# Patient Record
Sex: FEMALE
[De-identification: names, ages, dates, MRNs, and addresses within clinical notes are randomized per-mention and may not be internally consistent; named-entity substitution may affect disease eponyms.]

---

## 2017-04-13 ENCOUNTER — HOSPITAL ENCOUNTER (OUTPATIENT)
Dept: HOSPITAL 14 - H.OPSURG | Age: 52
Discharge: HOME | End: 2017-04-13
Payer: MEDICAID

## 2017-04-13 VITALS — DIASTOLIC BLOOD PRESSURE: 77 MMHG | SYSTOLIC BLOOD PRESSURE: 110 MMHG | TEMPERATURE: 98.2 F

## 2017-04-13 VITALS — RESPIRATION RATE: 20 BRPM

## 2017-04-13 VITALS — BODY MASS INDEX: 35.9 KG/M2

## 2017-04-13 VITALS — HEART RATE: 100 BPM | OXYGEN SATURATION: 96 %

## 2017-04-13 DIAGNOSIS — M54.30: ICD-10-CM

## 2017-04-13 DIAGNOSIS — K43.2: Primary | ICD-10-CM

## 2017-04-13 DIAGNOSIS — I10: ICD-10-CM

## 2017-04-13 RX ADMIN — HYDROMORPHONE HYDROCHLORIDE PRN MG: 1 INJECTION, SOLUTION INTRAMUSCULAR; INTRAVENOUS; SUBCUTANEOUS at 15:40

## 2017-04-13 RX ADMIN — HYDROMORPHONE HYDROCHLORIDE PRN MG: 1 INJECTION, SOLUTION INTRAMUSCULAR; INTRAVENOUS; SUBCUTANEOUS at 15:10

## 2017-04-13 RX ADMIN — HYDROMORPHONE HYDROCHLORIDE PRN MG: 1 INJECTION, SOLUTION INTRAMUSCULAR; INTRAVENOUS; SUBCUTANEOUS at 15:50

## 2017-04-13 RX ADMIN — HYDROMORPHONE HYDROCHLORIDE PRN MG: 1 INJECTION, SOLUTION INTRAMUSCULAR; INTRAVENOUS; SUBCUTANEOUS at 14:45

## 2017-04-13 RX ADMIN — HYDROMORPHONE HYDROCHLORIDE PRN MG: 1 INJECTION, SOLUTION INTRAMUSCULAR; INTRAVENOUS; SUBCUTANEOUS at 16:10

## 2017-04-13 RX ADMIN — HYDROMORPHONE HYDROCHLORIDE PRN MG: 1 INJECTION, SOLUTION INTRAMUSCULAR; INTRAVENOUS; SUBCUTANEOUS at 15:25

## 2017-04-13 RX ADMIN — HYDROMORPHONE HYDROCHLORIDE PRN MG: 1 INJECTION, SOLUTION INTRAMUSCULAR; INTRAVENOUS; SUBCUTANEOUS at 15:00

## 2017-04-13 RX ADMIN — HYDROMORPHONE HYDROCHLORIDE PRN MG: 1 INJECTION, SOLUTION INTRAMUSCULAR; INTRAVENOUS; SUBCUTANEOUS at 16:30

## 2017-04-13 NOTE — OP
PROCEDURE DATE: 04/13/2017



SURGEON:  Dr. Ramos



ASSISTANT:  Dr. Damon



ANESTHESIA:  General.



ANESTHESIOLOGIST:  Dr. Fung



PREOPERATIVE DIAGNOSIS:  Incisional umbilical hernia.



POSTOPERATIVE DIAGNOSIS:  Incisional umbilical hernia.



PROCEDURE:  Umbilical hernia repair with mesh.



DESCRIPTION OF OPERATION:  With the patient in the supine position under adequate general anesthesia,
 the abdomen was prepped and draped in the usual sterile manner.  A longitudinal incision was made al
mele the right side of the umbilicus, taken down through to the subcutaneous tissue.  Upon entering th
e subcutaneous layer, a hernia sac was noted bulging up into the subcutaneous tissue and the incision
 was extended downward to encompass the area of the hernia sac.  The sac was freed from the surroundi
ng subcutaneous tissue and was noted to extend into the actual umbilicus.  The umbilicus was elevated
 and dissected off the area of the sac.  The sac was opened.  It was noted to contain loculated oment
um, which was adherent at points and there were intra-hernia loculations as well.  The omentum was co
mpletely freed from the sac and the sac was completely freed from the subcutaneous tissue where it ex
tended caudad to the fascial defect.  The hernia sac was divided at the level of the fascia and the o
mentum was completely freed circumferentially and reduced into the peritoneal cavity.  When the sac a
nd the peritoneal surface had been all completely freed, there was noted to be a fascial defect of ap
proximately 2 x 4 cm somewhat longitudinally oriented.  The figure-of-eight sutures of 0 Prolene were
 placed at the upper and lower ends of the defect to medialize the musculature and an 8 cm Ventralex 
hernia patch was then placed within the remaining defect and fully deployed to completely cover the d
efect with the edges of the central portion of the defect being sutured to the suture tails which bro
ught them very close to complete closure.  This suturing was performed with 2-0 Prolene interrupted s
utures.  When this had been completed, the patch was noted to cover the hernia and the defect was wel
l closed.  The base of the umbilicus was sutured down to the area of the closure with a 4-0 Monocryl 
suture and closure was performed first with a few interrupted subcutaneous sutures of 4-0 Monocryl an
d then running subcuticular suture of 4-0 Monocryl and Steri-Strips.  Dry sterile dressing was applie
d.  The patient tolerated the procedure well and transferred to the recovery room in stable condition
.  Estimated blood loss for the procedure was 5 mL.





__________________________________________

Ethan Ramos MD







cc:



DD: 04/13/2017 18:07:13  58

TT: 04/13/2017 19:25:26

Job # [id number]

## 2017-04-13 NOTE — CP.SDSHP
[facility] Day [facility]





- Allergies


Allergies: 


Allergies





ceftriaxone sodium [From Rocephin] Allergy (Verified 08/17/16 16:40)


 RASH


ciprofloxacin [From Cipro] Allergy (Verified 08/17/16 16:40)


 RASH


ciprofloxacin HCl [From Cipro] Allergy (Verified 08/17/16 16:40)


 RASH


naproxen Allergy (Verified 08/17/16 16:40)


 RASH


Penicillins Allergy (Verified 08/17/16 16:40)


 RASH











- Physical Exam


Vital Signs: 


 Vital Signs











  04/13/17 04/13/17





  11:15 11:25


 


Temperature  97.2 F L


 


Pulse Rate 78 78


 


Respiratory  18





Rate  


 


Blood Pressure  122/66


 


O2 Sat by Pulse  100





Oximetry  














Short Stay Discharge





- Short Stay Discharge


Admitting Diagnosis/Reason for Visit: K43.2


Disposition: HOME/ ROUTINE


Referrals: 


Yoana Tenorio MD [Primary Care Provider] - 


Follow-up: 


F/U in office in 1-2 weeks.


Additional Instructions (Diet, Activity): 


May resume regular diet, light activities.  Avoid any heavy lifting >10lbs for 

6 weeks.  Take percocet as prescribed for pain.  Make an appointment to see Dr. Ramos in office in 1-2 weeks.


Progress Note/Discharge Note with Instructions: 


as above

## 2017-04-13 NOTE — PCM.SURG1
Surgeon's Initial Post Op Note





- Surgeon's Notes


Surgeon: Dr. Ramos


Assistant: Dr Damon


Type of Anesthesia: General Endo


Pre-Operative Diagnosis: Incisional hernia


Operative Findings: Incisional Hernia containing omentum


Post-Operative Diagnosis: Incisional Hernia


Operation Performed: Incisional Hernia repair with mesh


Specimen/Specimens Removed: hernia sac


Estimated Blood Loss: EBL {In ML}: 5


Blood Products Given: N/A


Drains Used: No Drains


Post-Op Condition: Good


Date of Surgery/Procedure: 04/13/17


Time of Surgery/Procedure: 14:40

## 2017-08-18 ENCOUNTER — HOSPITAL ENCOUNTER (EMERGENCY)
Dept: HOSPITAL 31 - C.ER | Age: 52
Discharge: HOME | End: 2017-08-18
Payer: MEDICAID

## 2017-08-18 VITALS — HEART RATE: 95 BPM | DIASTOLIC BLOOD PRESSURE: 75 MMHG | SYSTOLIC BLOOD PRESSURE: 124 MMHG | TEMPERATURE: 98.7 F

## 2017-08-18 VITALS — OXYGEN SATURATION: 97 % | RESPIRATION RATE: 18 BRPM

## 2017-08-18 VITALS — BODY MASS INDEX: 35.9 KG/M2

## 2017-08-18 DIAGNOSIS — J20.9: Primary | ICD-10-CM

## 2018-07-23 ENCOUNTER — HOSPITAL ENCOUNTER (EMERGENCY)
Dept: HOSPITAL 31 - C.ER | Age: 53
Discharge: HOME | End: 2018-07-23
Payer: MEDICAID

## 2018-07-23 VITALS
TEMPERATURE: 99.7 F | SYSTOLIC BLOOD PRESSURE: 114 MMHG | DIASTOLIC BLOOD PRESSURE: 75 MMHG | HEART RATE: 86 BPM | RESPIRATION RATE: 16 BRPM

## 2018-07-23 VITALS — OXYGEN SATURATION: 99 %

## 2018-07-23 VITALS — BODY MASS INDEX: 35.9 KG/M2

## 2018-07-23 DIAGNOSIS — J11.1: Primary | ICD-10-CM

## 2018-11-20 ENCOUNTER — HOSPITAL ENCOUNTER (EMERGENCY)
Dept: HOSPITAL 31 - C.ER | Age: 53
Discharge: HOME | End: 2018-11-20
Payer: MEDICAID

## 2018-11-20 VITALS — OXYGEN SATURATION: 98 % | RESPIRATION RATE: 20 BRPM

## 2018-11-20 VITALS — DIASTOLIC BLOOD PRESSURE: 78 MMHG | HEART RATE: 59 BPM | TEMPERATURE: 97.5 F | SYSTOLIC BLOOD PRESSURE: 111 MMHG

## 2018-11-20 VITALS — BODY MASS INDEX: 35.9 KG/M2

## 2018-11-20 DIAGNOSIS — M54.31: Primary | ICD-10-CM

## 2018-11-20 LAB
BILIRUB UR-MCNC: NEGATIVE MG/DL
GLUCOSE UR STRIP-MCNC: NORMAL MG/DL
HCG,QUALITATIVE URINE: NEGATIVE
LEUKOCYTE ESTERASE UR-ACNC: (no result) LEU/UL
PH UR STRIP: 6 [PH] (ref 5–8)
PROT UR STRIP-MCNC: NEGATIVE MG/DL
RBC # UR STRIP: NEGATIVE /UL
SP GR UR STRIP: 1.02 (ref 1–1.03)
SQUAMOUS EPITHIAL: 1 /HPF (ref 0–5)
UROBILINOGEN UR-MCNC: NORMAL MG/DL (ref 0.2–1)

## 2018-11-28 ENCOUNTER — HOSPITAL ENCOUNTER (EMERGENCY)
Dept: HOSPITAL 14 - H.ER | Age: 53
Discharge: HOME | End: 2018-11-28
Payer: MEDICAID

## 2018-11-28 VITALS
SYSTOLIC BLOOD PRESSURE: 113 MMHG | DIASTOLIC BLOOD PRESSURE: 75 MMHG | RESPIRATION RATE: 18 BRPM | HEART RATE: 58 BPM | TEMPERATURE: 97.9 F

## 2018-11-28 VITALS — OXYGEN SATURATION: 99 %

## 2018-11-28 VITALS — BODY MASS INDEX: 33.2 KG/M2

## 2018-11-28 DIAGNOSIS — M54.31: Primary | ICD-10-CM

## 2018-11-28 PROCEDURE — 96372 THER/PROPH/DIAG INJ SC/IM: CPT

## 2018-11-28 PROCEDURE — 99283 EMERGENCY DEPT VISIT LOW MDM: CPT

## 2018-11-28 NOTE — ED PDOC
HPI: Back


Time Seen by Provider: 11/28/18 01:37


Chief Complaint (Nursing): Back Pain


Chief Complaint (Provider): Back pain


History Per: Patient


History/Exam Limitations: no limitations


Onset/Duration Of Symptoms: Days (1 week)


Current Symptoms Are (Timing): Still Present


Severity: Severe


Exacerbating Factor(s): Movement, Sitting, Standing


Additional Complaint(s): 


52 y/o F with HTN and DM-II who presents with back pain x 1 week. Patient states

that she began having Right lower back pain about 1 week with radiation down 

Right leg and to lower abdomen with cramping sensation and some tingling in leg.

She works as an attendant at a gym but denies any trauma, falls, numbness in 

lower extremity, fever, chills. Pt states that she has an allergy to Naproxen 

but has taken Ibuprofen many times w/o any reaction. 





Past Medical History


Reviewed: Historical Data, Nursing Documentation, Vital Signs


Vital Signs: 


                                Last Vital Signs











Temp  98.0 F   11/28/18 00:58


 


Pulse  59 L  11/28/18 00:58


 


Resp  16   11/28/18 00:58


 


BP  116/74   11/28/18 00:58


 


Pulse Ox  99   11/28/18 00:58














- Medical History


PMH: Diabetes, HTN


   Denies: Chronic Kidney Disease





- Family History


Family History: States: Unknown Family Hx





- Immunization History


Hx Tetanus Toxoid Vaccination: No


Hx Influenza Vaccination: No


Hx Pneumococcal Vaccination: No





- Home Medications


Home Medications: 


                                Ambulatory Orders











 Medication  Instructions  Recorded


 


Losartan/Hydrochlorothiazide 1 tab PO DAILY 08/17/16





[Losartan-Hctz 50-12.5 mg Tab]  


 


MetFORMIN ER [Glucophage XR] 500 mg PO DAILY 07/23/18


 


Acetaminophen [Tylenol 325mg tab] 650 mg PO Q4 #50 tab 11/20/18


 


Alogliptin Pastor/Metformin HCl PO BID 11/20/18


 


Lidocaine 5% [Lidoderm] 1 ea TD DAILY #6 patch 11/20/18


 


Poglitazone PO DAILY 11/20/18


 


Cyclobenzaprine [Cyclobenzaprine 10 mg PO Q8 PRN 5 Days  tab 11/28/18





HCl]  


 


Ibuprofen [Motrin Tab] 600 mg PO Q6 PRN 5 Days  tab 11/28/18














- Allergies


Allergies/Adverse Reactions: 


                                    Allergies











Allergy/AdvReac Type Severity Reaction Status Date / Time


 


ceftriaxone sodium Allergy  RASH Verified 11/28/18 00:58





[From Rocephin]     


 


ciprofloxacin [From Cipro] Allergy  RASH Verified 11/28/18 00:58


 


ciprofloxacin HCl Allergy  RASH Verified 11/28/18 00:58





[From Cipro]     


 


naproxen Allergy  RASH Verified 11/28/18 00:58


 


Penicillins Allergy  RASH Verified 11/28/18 00:58














Review of Systems


ROS Statement: Except As Marked, All Systems Reviewed And Found Negative


Constitutional: Negative for: Fever, Chills


Musculoskeletal: Positive for: Back Pain.  Negative for: Neck Pain


Skin: Negative for: Rash





Physical Exam





- Reviewed


Nursing Documentation Reviewed: Yes


Vital Signs Reviewed: Yes





- Physical Exam


Appears: Positive for: Uncomfortable


Head Exam: Positive for: ATRAUMATIC


Skin: Positive for: Normal Color


Neck: Positive for: Normal


Pulses-Dorsalis Pedis (R): 2+


Back: Positive for: Normal Inspection, Vertebral Tenderness, Decreased ROM (with

flexion of back)


Extremity: Negative for: Normal ROM (pain with flexion and abduction at the hip 

on Right)


Neurologic/Psych: Positive for: Alert, Oriented, Other (sensation = in B/L lower

extremitiues)





- ECG


O2 Sat by Pulse Oximetry: 99





Medical Decision Making


Medical Decision Making: 


Toradol 30mg IM x 1


Flexeril 10mg PO x 1


Urine dip: no LE, nitrites





Re-evaluation at 3:40am: pain has improved. Ready for d/c home. 








Disposition





- Clinical Impression


Clinical Impression: 


 Sciatica








- Patient ED Disposition


Is Patient to be Admitted: No


Counseled Patient/Family Regarding: Diagnosis, Need For Followup





- Disposition


Disposition: Routine/Home


Disposition Time: 04:23


Condition: STABLE


Additional Instructions: 


F/u with your primary care doctor as needed. Return to ED if you develop 

worsening numbness or tingling in feet, weakness in lower extremities or 

inability to hold urine/feces. Take Flexeril and Ibuprofen as needed for pain. 

Avoid driving or operating heavy machinery while taking Flexeril. 


Prescriptions: 


Cyclobenzaprine [Cyclobenzaprine HCl] 10 mg PO Q8 PRN 5 Days  tab


 PRN Reason: Pain, Moderate (4-7)


Ibuprofen [Motrin Tab] 600 mg PO Q6 PRN 5 Days  tab


 PRN Reason: Pain, Moderate (4-7)


Instructions:  Sciatica (DC), Sciatica Exercises


Forms:  [facility] (Andorran), [facility] ED School/Work Excuse


Print Language: Chinese

## 2018-12-20 ENCOUNTER — HOSPITAL ENCOUNTER (EMERGENCY)
Dept: HOSPITAL 31 - C.ER | Age: 53
Discharge: HOME | End: 2018-12-20
Payer: MEDICAID

## 2018-12-20 VITALS — RESPIRATION RATE: 16 BRPM

## 2018-12-20 VITALS — OXYGEN SATURATION: 99 %

## 2018-12-20 VITALS — TEMPERATURE: 98.6 F | HEART RATE: 78 BPM | DIASTOLIC BLOOD PRESSURE: 75 MMHG | SYSTOLIC BLOOD PRESSURE: 103 MMHG

## 2018-12-20 VITALS — BODY MASS INDEX: 33.2 KG/M2

## 2018-12-20 DIAGNOSIS — K52.9: Primary | ICD-10-CM

## 2018-12-20 DIAGNOSIS — E11.9: ICD-10-CM

## 2018-12-20 DIAGNOSIS — I10: ICD-10-CM

## 2018-12-20 LAB
ALBUMIN SERPL-MCNC: 4.7 G/DL (ref 3.5–5)
ALBUMIN/GLOB SERPL: 1.6 {RATIO} (ref 1–2.1)
ALT SERPL-CCNC: 39 U/L (ref 9–52)
APTT BLD: 36 SECONDS (ref 21–34)
AST SERPL-CCNC: 41 U/L (ref 14–36)
BACTERIA #/AREA URNS HPF: (no result) /[HPF]
BASOPHILS # BLD AUTO: 0 K/UL (ref 0–0.2)
BASOPHILS NFR BLD: 0.5 % (ref 0–2)
BILIRUB UR-MCNC: NEGATIVE MG/DL
BUN SERPL-MCNC: 18 MG/DL (ref 7–17)
CALCIUM SERPL-MCNC: 9.4 MG/DL (ref 8.6–10.4)
EOSINOPHIL # BLD AUTO: 0.1 K/UL (ref 0–0.7)
EOSINOPHIL NFR BLD: 1.6 % (ref 0–4)
ERYTHROCYTE [DISTWIDTH] IN BLOOD BY AUTOMATED COUNT: 14.5 % (ref 11.5–14.5)
GFR NON-AFRICAN AMERICAN: > 60
GLUCOSE UR STRIP-MCNC: NORMAL MG/DL
HGB BLD-MCNC: 13.9 G/DL (ref 11–16)
INR PPP: 1.1
LEUKOCYTE ESTERASE UR-ACNC: (no result) LEU/UL
LIPASE: 245 U/L (ref 23–300)
LYMPHOCYTES # BLD AUTO: 1.8 K/UL (ref 1–4.3)
LYMPHOCYTES NFR BLD AUTO: 20.1 % (ref 20–40)
MCH RBC QN AUTO: 28.8 PG (ref 27–31)
MCHC RBC AUTO-ENTMCNC: 33.6 G/DL (ref 33–37)
MCV RBC AUTO: 85.7 FL (ref 81–99)
MONOCYTES # BLD: 0.7 K/UL (ref 0–0.8)
MONOCYTES NFR BLD: 7.5 % (ref 0–10)
NEUTROPHILS # BLD: 6.2 K/UL (ref 1.8–7)
NEUTROPHILS NFR BLD AUTO: 70.3 % (ref 50–75)
NRBC BLD AUTO-RTO: 0 % (ref 0–2)
PH UR STRIP: 5 [PH] (ref 5–8)
PLATELET # BLD: 224 K/UL (ref 130–400)
PMV BLD AUTO: 9.3 FL (ref 7.2–11.7)
PROT UR STRIP-MCNC: NEGATIVE MG/DL
PROTHROMBIN TIME: 12 SECONDS (ref 9.7–12.2)
RBC # BLD AUTO: 4.83 MIL/UL (ref 3.8–5.2)
RBC # UR STRIP: NEGATIVE /UL
SP GR UR STRIP: 1.02 (ref 1–1.03)
SQUAMOUS EPITHIAL: 2 /HPF (ref 0–5)
UROBILINOGEN UR-MCNC: NORMAL MG/DL (ref 0.2–1)
WBC # BLD AUTO: 8.9 K/UL (ref 4.8–10.8)

## 2018-12-20 PROCEDURE — 85610 PROTHROMBIN TIME: CPT

## 2018-12-20 PROCEDURE — 74177 CT ABD & PELVIS W/CONTRAST: CPT

## 2018-12-20 PROCEDURE — 80353 DRUG SCREENING COCAINE: CPT

## 2018-12-20 PROCEDURE — 83992 ASSAY FOR PHENCYCLIDINE: CPT

## 2018-12-20 PROCEDURE — 80361 OPIATES 1 OR MORE: CPT

## 2018-12-20 PROCEDURE — 93005 ELECTROCARDIOGRAM TRACING: CPT

## 2018-12-20 PROCEDURE — 85025 COMPLETE CBC W/AUTO DIFF WBC: CPT

## 2018-12-20 PROCEDURE — 96375 TX/PRO/DX INJ NEW DRUG ADDON: CPT

## 2018-12-20 PROCEDURE — 80053 COMPREHEN METABOLIC PANEL: CPT

## 2018-12-20 PROCEDURE — 80358 DRUG SCREENING METHADONE: CPT

## 2018-12-20 PROCEDURE — 96374 THER/PROPH/DIAG INJ IV PUSH: CPT

## 2018-12-20 PROCEDURE — 96361 HYDRATE IV INFUSION ADD-ON: CPT

## 2018-12-20 PROCEDURE — 83690 ASSAY OF LIPASE: CPT

## 2018-12-20 PROCEDURE — 80346 BENZODIAZEPINES1-12: CPT

## 2018-12-20 PROCEDURE — 80349 CANNABINOIDS NATURAL: CPT

## 2018-12-20 PROCEDURE — 80324 DRUG SCREEN AMPHETAMINES 1/2: CPT

## 2018-12-20 PROCEDURE — 80345 DRUG SCREENING BARBITURATES: CPT

## 2018-12-20 PROCEDURE — 81001 URINALYSIS AUTO W/SCOPE: CPT

## 2018-12-20 PROCEDURE — 85730 THROMBOPLASTIN TIME PARTIAL: CPT

## 2018-12-20 PROCEDURE — 99285 EMERGENCY DEPT VISIT HI MDM: CPT

## 2018-12-20 PROCEDURE — 84484 ASSAY OF TROPONIN QUANT: CPT

## 2019-01-09 ENCOUNTER — HOSPITAL ENCOUNTER (EMERGENCY)
Dept: HOSPITAL 31 - C.ER | Age: 54
Discharge: LEFT BEFORE DISCHARGE | End: 2019-01-09
Payer: MEDICAID

## 2019-01-09 VITALS
OXYGEN SATURATION: 99 % | RESPIRATION RATE: 22 BRPM | HEART RATE: 75 BPM | DIASTOLIC BLOOD PRESSURE: 75 MMHG | TEMPERATURE: 98.3 F | SYSTOLIC BLOOD PRESSURE: 102 MMHG

## 2019-01-09 VITALS — BODY MASS INDEX: 33.2 KG/M2

## 2019-01-09 DIAGNOSIS — R50.9: ICD-10-CM

## 2019-01-09 DIAGNOSIS — Z02.89: Primary | ICD-10-CM

## 2019-01-13 ENCOUNTER — HOSPITAL ENCOUNTER (EMERGENCY)
Dept: HOSPITAL 31 - C.ER | Age: 54
Discharge: HOME | End: 2019-01-13
Payer: MEDICAID

## 2019-01-13 VITALS — OXYGEN SATURATION: 99 %

## 2019-01-13 VITALS — BODY MASS INDEX: 33.2 KG/M2

## 2019-01-13 VITALS
RESPIRATION RATE: 16 BRPM | SYSTOLIC BLOOD PRESSURE: 110 MMHG | HEART RATE: 55 BPM | DIASTOLIC BLOOD PRESSURE: 78 MMHG | TEMPERATURE: 97.9 F

## 2019-01-13 DIAGNOSIS — J11.1: Primary | ICD-10-CM

## 2019-01-13 DIAGNOSIS — I10: ICD-10-CM

## 2019-02-21 ENCOUNTER — HOSPITAL ENCOUNTER (EMERGENCY)
Dept: HOSPITAL 31 - C.ER | Age: 54
Discharge: HOME | End: 2019-02-21
Payer: MEDICAID

## 2019-02-21 VITALS
DIASTOLIC BLOOD PRESSURE: 64 MMHG | RESPIRATION RATE: 16 BRPM | TEMPERATURE: 98 F | SYSTOLIC BLOOD PRESSURE: 104 MMHG | HEART RATE: 80 BPM

## 2019-02-21 VITALS — BODY MASS INDEX: 33.2 KG/M2

## 2019-02-21 VITALS — OXYGEN SATURATION: 98 %

## 2019-02-21 DIAGNOSIS — J11.1: ICD-10-CM

## 2019-02-21 DIAGNOSIS — N39.0: Primary | ICD-10-CM

## 2019-02-21 LAB
ALBUMIN SERPL-MCNC: 4.6 G/DL (ref 3.5–5)
ALBUMIN/GLOB SERPL: 1.7 {RATIO} (ref 1–2.1)
ALT SERPL-CCNC: 24 U/L (ref 9–52)
AST SERPL-CCNC: 30 U/L (ref 14–36)
BASOPHILS # BLD AUTO: 0.1 K/UL (ref 0–0.2)
BASOPHILS NFR BLD: 0.5 % (ref 0–2)
BILIRUB UR-MCNC: NEGATIVE MG/DL
BUN SERPL-MCNC: 16 MG/DL (ref 7–17)
CALCIUM SERPL-MCNC: 9.4 MG/DL (ref 8.6–10.4)
EOSINOPHIL # BLD AUTO: 0.1 K/UL (ref 0–0.7)
EOSINOPHIL NFR BLD: 0.6 % (ref 0–4)
ERYTHROCYTE [DISTWIDTH] IN BLOOD BY AUTOMATED COUNT: 14.8 % (ref 11.5–14.5)
GFR NON-AFRICAN AMERICAN: > 60
GLUCOSE UR STRIP-MCNC: NORMAL MG/DL
HCG,QUALITATIVE URINE: NEGATIVE
HGB BLD-MCNC: 12.1 G/DL (ref 11–16)
LEUKOCYTE ESTERASE UR-ACNC: (no result) LEU/UL
LYMPHOCYTES # BLD AUTO: 1.7 K/UL (ref 1–4.3)
LYMPHOCYTES NFR BLD AUTO: 13.2 % (ref 20–40)
MCH RBC QN AUTO: 29 PG (ref 27–31)
MCHC RBC AUTO-ENTMCNC: 33.3 G/DL (ref 33–37)
MCV RBC AUTO: 86.9 FL (ref 81–99)
MONOCYTES # BLD: 0.9 K/UL (ref 0–0.8)
MONOCYTES NFR BLD: 7.1 % (ref 0–10)
NEUTROPHILS # BLD: 10.2 K/UL (ref 1.8–7)
NEUTROPHILS NFR BLD AUTO: 78.6 % (ref 50–75)
NRBC BLD AUTO-RTO: 0 % (ref 0–2)
PH UR STRIP: 6 [PH] (ref 5–8)
PLATELET # BLD: 207 K/UL (ref 130–400)
PMV BLD AUTO: 8.8 FL (ref 7.2–11.7)
PROT UR STRIP-MCNC: NEGATIVE MG/DL
RBC # BLD AUTO: 4.19 MIL/UL (ref 3.8–5.2)
RBC # UR STRIP: NEGATIVE /UL
SP GR UR STRIP: 1.01 (ref 1–1.03)
SQUAMOUS EPITHIAL: 1 /HPF (ref 0–5)
UROBILINOGEN UR-MCNC: NORMAL MG/DL (ref 0.2–1)
WBC # BLD AUTO: 12.9 K/UL (ref 4.8–10.8)

## 2019-02-21 PROCEDURE — 87804 INFLUENZA ASSAY W/OPTIC: CPT

## 2019-02-21 PROCEDURE — 84703 CHORIONIC GONADOTROPIN ASSAY: CPT

## 2019-02-21 PROCEDURE — 99285 EMERGENCY DEPT VISIT HI MDM: CPT

## 2019-02-21 PROCEDURE — 87086 URINE CULTURE/COLONY COUNT: CPT

## 2019-02-21 PROCEDURE — 96374 THER/PROPH/DIAG INJ IV PUSH: CPT

## 2019-02-21 PROCEDURE — 81001 URINALYSIS AUTO W/SCOPE: CPT

## 2019-02-21 PROCEDURE — 96361 HYDRATE IV INFUSION ADD-ON: CPT

## 2019-02-21 PROCEDURE — 84484 ASSAY OF TROPONIN QUANT: CPT

## 2019-02-21 PROCEDURE — 93005 ELECTROCARDIOGRAM TRACING: CPT

## 2019-02-21 PROCEDURE — 85025 COMPLETE CBC W/AUTO DIFF WBC: CPT

## 2019-02-21 PROCEDURE — 82550 ASSAY OF CK (CPK): CPT

## 2019-02-21 PROCEDURE — 80053 COMPREHEN METABOLIC PANEL: CPT

## 2019-02-21 PROCEDURE — 71046 X-RAY EXAM CHEST 2 VIEWS: CPT

## 2019-02-24 ENCOUNTER — HOSPITAL ENCOUNTER (EMERGENCY)
Dept: HOSPITAL 14 - H.ER | Age: 54
Discharge: HOME | End: 2019-02-24
Payer: MEDICAID

## 2019-02-24 VITALS — DIASTOLIC BLOOD PRESSURE: 77 MMHG | SYSTOLIC BLOOD PRESSURE: 115 MMHG | HEART RATE: 83 BPM | TEMPERATURE: 98.4 F

## 2019-02-24 VITALS — RESPIRATION RATE: 18 BRPM | OXYGEN SATURATION: 100 %

## 2019-02-24 VITALS — BODY MASS INDEX: 34.7 KG/M2

## 2019-02-24 DIAGNOSIS — Z90.710: ICD-10-CM

## 2019-02-24 DIAGNOSIS — Z88.0: ICD-10-CM

## 2019-02-24 DIAGNOSIS — J40: Primary | ICD-10-CM

## 2019-02-24 DIAGNOSIS — I10: ICD-10-CM

## 2019-02-24 DIAGNOSIS — Z79.84: ICD-10-CM

## 2019-02-24 DIAGNOSIS — Z88.1: ICD-10-CM

## 2019-02-24 DIAGNOSIS — E11.9: ICD-10-CM

## 2019-02-24 NOTE — RAD
Date of service: 



02/24/2019



HISTORY:

Cough 6 days duration.



COMPARISON:

11/05/2015



TECHNIQUE:

Chest PA and lateral



FINDINGS:



LUNGS:

No active pulmonary disease.



PLEURA:

No significant pleural effusion identified. No pneumothorax apparent.



CARDIOVASCULAR:

No aortic atherosclerotic calcification present.



 No radiographic findings to suggest acute or significant 

cardiovascular disease. No pulmonary vascular congestion. 



OSSEOUS STRUCTURES:

No significant abnormalities.



VISUALIZED UPPER ABDOMEN:

Normal.



OTHER FINDINGS:

None.



IMPRESSION:

No active disease. No significant interval change compared to the 

prior examination(s).

## 2019-02-24 NOTE — ED PDOC
HPI: CCC, URI, Sore Throat


Time Seen by Provider: 02/24/19 07:51


Chief Complaint (Nursing): Cough, Cold, Congestion


Chief Complaint (Provider): Cough, Cold


History Per: Patient


History/Exam Limitations: no limitations


Onset/Duration Of Symptoms: Days (6)


Current Symptoms Are (Timing): Still Present


Sick Contacts (Context): None


Associated Symptoms: Chills, Cough, Sputum.  denies: Vomiting, Diarrhea


Additional Complaint(s): 


53 year old female with diabetes presents to the ED for an evaluation of cold, 

body ache and troubled breathing onset for 6 days. Patient also reports of 

chills, phlegm with cough and choking sensation. She states her PMD is on 

vacation therefore she came to the ED for further evaluation. Otherwise, she 

denies sick contacts, abdominal pain, nausea, vomiting or diarrhea. 





PMD: Dr. Peng











Past Medical History


Reviewed: Historical Data, Nursing Documentation, Vital Signs


Vital Signs: 





                                Last Vital Signs











Temp  98.5 F   02/24/19 07:47


 


Pulse  97 H  02/24/19 07:47


 


Resp  18   02/24/19 07:47


 


BP  127/85   02/24/19 07:47


 


Pulse Ox  100   02/24/19 07:47














- Medical History


PMH: Bronchitis, Diabetes, HTN


   Denies: Anemia (Pateint denies), Chronic Kidney Disease





- Surgical History


Surgical History: Hernia Repair


Other surgeries: hysterectomy





- Family History


Family History: States: Unknown Family Hx





- Social History


Current smoker - smoking cessation education provided: No


Alcohol: None


Drugs: Denies





- Immunization History


Hx Tetanus Toxoid Vaccination: No


Hx Influenza Vaccination: No


Hx Pneumococcal Vaccination: No





- Home Medications


Home Medications: 


                                Ambulatory Orders











 Medication  Instructions  Recorded


 


Alogliptin Pastor/Metformin HCl 1 each PO BID 12/20/18





[Alogliptin-Metformin 12.5-500]  


 


Losartan/Hydrochlorothiazide 1 each PO DAILY 12/20/18





[Losartan-Hctz 50-12.5 mg Tab]  


 


Pioglitazone HCl 45 mg PO DAILY 12/20/18


 


Acetaminophen [Tylenol 325mg tab] 2 tab PO Q4H #30 tab 02/21/19


 


Azithromycin [Zithromax] 2 tab PO DAILY #6 tab 02/21/19


 


Ibuprofen [Motrin] 1 tab PO Q6 #30 tab 02/21/19


 


Azithromycin [Zithromax Tri-Mario] 500 mg PO DAILY #1 packet 02/24/19


 


Promethazine DM [Phenergan DM 5 ml PO Q8H PRN #120 ml 02/24/19





Syrup]  














- Allergies


Allergies/Adverse Reactions: 


                                    Allergies











Allergy/AdvReac Type Severity Reaction Status Date / Time


 


ceftriaxone sodium Allergy  RASH Verified 02/21/19 14:59





[From Rocephin]     


 


ciprofloxacin [From Cipro] Allergy  RASH Verified 02/21/19 14:59


 


ciprofloxacin HCl Allergy  RASH Verified 02/21/19 14:59





[From Cipro]     


 


naproxen Allergy  RASH Verified 02/21/19 14:59


 


Penicillins Allergy  RASH Verified 02/21/19 14:59














Review of Systems


ROS Statement: Except As Marked, All Systems Reviewed And Found Negative


Constitutional: Positive for: Chills, Other (body ache)


Respiratory: Positive for: Cough, Shortness of Breath, Sputum


Gastrointestinal: Negative for: Nausea, Vomiting, Abdominal Pain, Diarrhea





Physical Exam





- Reviewed


Nursing Documentation Reviewed: Yes


Vital Signs Reviewed: Yes





- Physical Exam


Appears: Positive for: Non-toxic, No Acute Distress


Head Exam: Positive for: ATRAUMATIC, NORMAL INSPECTION, NORMOCEPHALIC


Skin: Positive for: Normal Color, Warm, Dry


Eye Exam: Positive for: EOMI, Normal appearance, PERRL


ENT: Positive for: Normal ENT Inspection


Neck: Positive for: Normal, Painless ROM, Supple


Cardiovascular/Chest: Positive for: Regular Rate, Rhythm.  Negative for: Murmur


Respiratory: Positive for: Normal Breath Sounds.  Negative for: Decreased Breath

Sounds, Respiratory Distress


Gastrointestinal/Abdominal: Positive for: Normal Exam, Soft.  Negative for: 

Tenderness


Back: Positive for: Normal Inspection


Extremity: Positive for: Normal ROM.  Negative for: Tenderness, Pedal Edema


Neurologic/Psych: Positive for: Alert, Oriented (x3)





- ECG


O2 Sat by Pulse Oximetry: 100 (RA)


Pulse Ox Interpretation: Normal





- Radiology


X-Ray: Viewed By Me


X-Ray Interpretation: No Acute Disease





Medical Decision Making


Medical Decision Making: 


Time: 0804


Impression: viral illness 


Plan: 


Pregnancy test (urine, POC)


Chest two views (PA/LAT)


Reevaluation 





--------------------------------------------------

----------------------------------------------- 





Scribe Attestation:


Documented by Farrah Daley, acting as a scribe for Gretta Goldsmith MD. 





Provider Scribe Attestation:


All medical record entries made by the Scribe were at my direction and 

personally dictated by me. I have reviewed the chart and agree that the record 

accurately reflects my personal performance of the history, physical exam, 

medical decision making, and the department course for this patient. I have also

personally directed, reviewed, and agree with the discharge instructions and 

disposition.














Disposition





- Clinical Impression


Clinical Impression: 


 Bronchitis








- Patient ED Disposition


Is Patient to be Admitted: No


Doctor Will See Patient In The: Office


Counseled Patient/Family Regarding: Diagnosis, Need For Followup, Rx Given





- Disposition


Referrals: 


Yoana Tenorio MD [Family Provider] - 


Disposition: Routine/Home


Disposition Time: 08:30


Condition: STABLE


Prescriptions: 


Azithromycin [Zithromax Tri-Mario] 500 mg PO DAILY #1 packet


Promethazine DM [Phenergan DM Syrup] 5 ml PO Q8H PRN #120 ml


 PRN Reason: Cough


Instructions:  Acute Bronchitis


Forms:  [facility] (English), [facility] ED School/Work Excuse


Print Language: Irish





- POA


Present On Arrival: None

## 2019-05-13 ENCOUNTER — HOSPITAL ENCOUNTER (EMERGENCY)
Dept: HOSPITAL 31 - C.ER | Age: 54
Discharge: HOME | End: 2019-05-13
Payer: MEDICAID

## 2019-05-13 VITALS
SYSTOLIC BLOOD PRESSURE: 103 MMHG | RESPIRATION RATE: 18 BRPM | HEART RATE: 68 BPM | TEMPERATURE: 98.1 F | OXYGEN SATURATION: 99 % | DIASTOLIC BLOOD PRESSURE: 70 MMHG

## 2019-05-13 VITALS — BODY MASS INDEX: 34.7 KG/M2

## 2019-05-13 DIAGNOSIS — M54.30: ICD-10-CM

## 2019-05-13 DIAGNOSIS — L50.9: Primary | ICD-10-CM

## 2019-05-13 DIAGNOSIS — I10: ICD-10-CM

## 2019-05-13 DIAGNOSIS — E11.9: ICD-10-CM
